# Patient Record
Sex: MALE | Race: BLACK OR AFRICAN AMERICAN | NOT HISPANIC OR LATINO | URBAN - METROPOLITAN AREA
[De-identification: names, ages, dates, MRNs, and addresses within clinical notes are randomized per-mention and may not be internally consistent; named-entity substitution may affect disease eponyms.]

---

## 2024-02-29 ENCOUNTER — EMERGENCY (EMERGENCY)
Facility: HOSPITAL | Age: 39
LOS: 1 days | Discharge: HOME | End: 2024-02-29
Admitting: EMERGENCY MEDICINE
Payer: COMMERCIAL

## 2024-02-29 VITALS
RESPIRATION RATE: 16 BRPM | SYSTOLIC BLOOD PRESSURE: 118 MMHG | HEART RATE: 58 BPM | OXYGEN SATURATION: 100 % | TEMPERATURE: 98 F | WEIGHT: 169.98 LBS | DIASTOLIC BLOOD PRESSURE: 75 MMHG

## 2024-02-29 VITALS
HEART RATE: 68 BPM | TEMPERATURE: 98 F | SYSTOLIC BLOOD PRESSURE: 132 MMHG | DIASTOLIC BLOOD PRESSURE: 77 MMHG | RESPIRATION RATE: 17 BRPM | OXYGEN SATURATION: 100 %

## 2024-02-29 PROCEDURE — 99284 EMERGENCY DEPT VISIT MOD MDM: CPT

## 2024-02-29 RX ORDER — DIAZEPAM 5 MG
1 TABLET ORAL
Qty: 9 | Refills: 0
Start: 2024-02-29 | End: 2024-03-02

## 2024-02-29 RX ORDER — LIDOCAINE 4 G/100G
1 CREAM TOPICAL ONCE
Refills: 0 | Status: COMPLETED | OUTPATIENT
Start: 2024-02-29 | End: 2024-02-29

## 2024-02-29 RX ORDER — KETOROLAC TROMETHAMINE 30 MG/ML
30 SYRINGE (ML) INJECTION ONCE
Refills: 0 | Status: DISCONTINUED | OUTPATIENT
Start: 2024-02-29 | End: 2024-02-29

## 2024-02-29 RX ORDER — IBUPROFEN 200 MG
1 TABLET ORAL
Qty: 15 | Refills: 0
Start: 2024-02-29 | End: 2024-03-04

## 2024-02-29 RX ADMIN — LIDOCAINE 1 PATCH: 4 CREAM TOPICAL at 12:42

## 2024-02-29 RX ADMIN — Medication 30 MILLIGRAM(S): at 12:53

## 2024-02-29 NOTE — ED ADULT TRIAGE NOTE - CHIEF COMPLAINT QUOTE
Pt reporting to the ED for R side neck pain after lifting heavy objects at work yesterday. No neuro defect, gait steady. no pmh.

## 2024-02-29 NOTE — ED PROVIDER NOTE - CLINICAL SUMMARY MEDICAL DECISION MAKING FREE TEXT BOX
37 y/o male c/o R sided neck pain after heavy lifting at work. Denies fall or trauma to the area. Pain is worse when looking to the right. Denies taking any OTC meds. Denies numbness, tingling, weakness, headache, fever or chills. Pt is well appearing, nad, afebrile, no midline neck tenderness. + right paraspinal tenderness and mild spasm, no neuro deficits, 5/5 muscle strength b/l UE, distal sensation intact, no erythema or edema- likely MSK sprain vs torticolis, no concern for cord compression- will treat with nsaids, muscle relaxers, heat.

## 2024-02-29 NOTE — ED PROVIDER NOTE - NSFOLLOWUPINSTRUCTIONS_ED_ALL_ED_FT
Spasmodic Torticollis    WHAT YOU NEED TO KNOW:    What is spasmodic torticollis? Spasmodic torticollis is a condition that causes your neck muscles to contract (shorten and tighten). Your neck twists suddenly and causes your head to tilt or turn without control. You may have trouble moving your neck. You may also have headaches, neck pain, or shoulder pain. Your neck muscles may have spasms, stiffness, or swelling. Symptoms often gets worse with stress, sudden movement, or muscle strain. Spasmodic torticollis is also called cervical dystonia.  Vertebral Column    What causes spasmodic torticollis? The exact cause of spasmodic torticollis is not known. You may have been born with the condition. Torticollis may happen after an injury to your cervical spine. It may also be caused by a medical condition that affects your muscles, bones, nervous system, eyes, or balance. Your risk may be higher if you have a family history of spasmodic torticollis.    How is spasmodic torticollis diagnosed? Your healthcare provider will examine your head and neck. You may also need any of the following:    X-ray, CT scan, or MRI pictures may be used to look for problems in your bones, muscles, brain, or blood vessels. You may be given contrast liquid to help the pictures show up better. Tell your healthcare provider if you have ever had an allergic reaction to contrast liquid. The MRI machine uses a powerful magnet. Do not enter the MRI room with anything metal. Metal can cause serious injury from the magnet. Tell the healthcare provider if you have any metal in or on your body.    Electromyography (EMG) is done to test your muscles and the nerves that control them. Electrodes (wires) are placed on the area of muscle being tested. Needles that enter your skin may be attached to the electrodes. The electrical activity of your muscles and nerves is measured by a machine attached to the electrodes. Your muscles are tested at rest and with activity.  How is spasmodic torticollis treated?    Medicines:  Muscle relaxers decrease pain and muscle spasms.    Botulinum toxin injections may be given to relax your muscles.    NSAIDs, such as ibuprofen, help decrease swelling, pain, and fever. This medicine is available with or without a doctor's order. NSAIDs can cause stomach bleeding or kidney problems in certain people. If you take blood thinner medicine, always ask if NSAIDs are safe for you. Always read the medicine label and follow directions. Do not give these medicines to children younger than 6 months without direction from a healthcare provider.    Acetaminophen decreases pain and fever. It is available without a doctor's order. Ask how much to take and how often to take it. Follow directions. Read the labels of all other medicines you are using to see if they also contain acetaminophen, or ask your doctor or pharmacist. Acetaminophen can cause liver damage if not taken correctly.    Prescription pain medicine may be given. Ask your healthcare provider how to take this medicine safely. Some prescription pain medicines contain acetaminophen. Do not take other medicines that contain acetaminophen without talking to your healthcare provider. Too much acetaminophen may cause liver damage. Prescription pain medicine may cause constipation. Ask your healthcare provider how to prevent or treat constipation.    Surgery may be used to cut the nerves that supply the affected muscles, or to separate the muscles.  What can I to do manage my symptoms?    Rest as needed. Return to your daily activities as directed.    Apply ice to decrease swelling and pain. Apply ice on your neck for 15 to 20 minutes every hour or as directed. Use an ice pack, or put crushed ice in a plastic bag. Cover the bag with a towel before you apply it. Apply ice for as many days as directed.    Apply heat to decrease pain and muscle spasms. Apply heat on your neck for 20 to 30 minutes every 2 hours, or as directed. Use a heat pack or a heating pad set on low. Apply heat for as many days as directed.    Wear a cervical collar as directed. A cervical collar helps support your neck.  Cervical Collars  Call your local emergency number (911 in the [place]) if:    You have sudden shortness of breath.    You have trouble moving your arms or legs.    Your arms or legs feel numb.  When should I seek immediate care?    You have increased pain in your neck or shoulder.    When should I call my doctor?    You have a fever.    You have swelling in your neck area that gets worse or does not go away.    You have questions or concerns about your condition or care.  CARE AGREEMENT:    You have the right to help plan your care. Learn about your health condition and how it may be treated. Discuss treatment options with your healthcare providers to decide what care you want to receive. You always have the right to refuse treatment.

## 2024-02-29 NOTE — ED ADULT NURSE NOTE - OBJECTIVE STATEMENT
38 year old male received to wellness c/o right sided neck pain starting yesterday s/p heavy lifting at work. pt with limited ROM to neck. skin intact. denies numbness. meds given as ordered. vs as noted. safety maintained

## 2024-02-29 NOTE — ED PROVIDER NOTE - OBJECTIVE STATEMENT
39 y/o male no pmh c/o R sided neck pain x1 day. Pt does heavy lifting at work and noticed slight pain after lifting something x 1day ago. The pain has gradually become worse. Pt woke up today with pain and stiffness when trying to look to the right. Denies numbness, tingling, weakness, swelling, redness, headache, fever or chills. Denies taking any OTC meds

## 2024-02-29 NOTE — ED PROVIDER NOTE - PATIENT PORTAL LINK FT
You can access the FollowMyHealth Patient Portal offered by [facility] by registering at the following website: [email or web address]. By joining [facility]’s FollowMyHealth portal, you will also be able to view your health information using other applications (apps) compatible with our system.

## 2024-02-29 NOTE — ED PROVIDER NOTE - NS_EDPROVIDERDISPOUSERTYPE_ED_A_ED
Detail Level: Detailed Detail Level: Zone I have personally evaluated and examined the patient. The Attending was available to me as a supervising provider if needed.